# Patient Record
Sex: FEMALE | Race: WHITE
[De-identification: names, ages, dates, MRNs, and addresses within clinical notes are randomized per-mention and may not be internally consistent; named-entity substitution may affect disease eponyms.]

---

## 2019-11-22 ENCOUNTER — HOSPITAL ENCOUNTER (INPATIENT)
Dept: HOSPITAL 26 - MED | Age: 39
LOS: 1 days | Discharge: HOME | DRG: 744 | End: 2019-11-23
Attending: OBSTETRICS & GYNECOLOGY | Admitting: OBSTETRICS & GYNECOLOGY
Payer: COMMERCIAL

## 2019-11-22 VITALS — SYSTOLIC BLOOD PRESSURE: 94 MMHG | DIASTOLIC BLOOD PRESSURE: 67 MMHG

## 2019-11-22 VITALS — HEIGHT: 63 IN | BODY MASS INDEX: 25.16 KG/M2 | WEIGHT: 142 LBS

## 2019-11-22 VITALS — SYSTOLIC BLOOD PRESSURE: 94 MMHG | DIASTOLIC BLOOD PRESSURE: 50 MMHG

## 2019-11-22 VITALS — SYSTOLIC BLOOD PRESSURE: 119 MMHG | DIASTOLIC BLOOD PRESSURE: 79 MMHG

## 2019-11-22 DIAGNOSIS — D25.9: ICD-10-CM

## 2019-11-22 DIAGNOSIS — D62: ICD-10-CM

## 2019-11-22 DIAGNOSIS — D64.9: ICD-10-CM

## 2019-11-22 DIAGNOSIS — F32.9: ICD-10-CM

## 2019-11-22 DIAGNOSIS — N92.0: Primary | ICD-10-CM

## 2019-11-22 LAB
ALBUMIN FLD-MCNC: 3.2 G/DL (ref 3.4–5)
ANION GAP SERPL CALCULATED.3IONS-SCNC: 10.8 MMOL/L (ref 8–16)
APPEARANCE UR: (no result)
AST SERPL-CCNC: 14 U/L (ref 15–37)
BASOPHILS # BLD AUTO: 0 K/UL (ref 0–0.22)
BASOPHILS NFR BLD AUTO: 0.6 % (ref 0–2)
BILIRUB SERPL-MCNC: 0.1 MG/DL (ref 0–1)
BILIRUB UR QL STRIP: NEGATIVE
BUN SERPL-MCNC: 10 MG/DL (ref 7–18)
CHLORIDE SERPL-SCNC: 106 MMOL/L (ref 98–107)
CO2 SERPL-SCNC: 24.9 MMOL/L (ref 21–32)
COLOR UR: (no result)
CREAT SERPL-MCNC: 0.8 MG/DL (ref 0.6–1.3)
EOSINOPHIL # BLD AUTO: 0 K/UL (ref 0–0.4)
EOSINOPHIL NFR BLD AUTO: 0.3 % (ref 0–4)
ERYTHROCYTE [DISTWIDTH] IN BLOOD BY AUTOMATED COUNT: 27.5 % (ref 11.6–13.7)
GFR SERPL CREATININE-BSD FRML MDRD: 103 ML/MIN (ref 90–?)
GLUCOSE SERPL-MCNC: 129 MG/DL (ref 74–106)
GLUCOSE UR STRIP-MCNC: NEGATIVE MG/DL
HCT VFR BLD AUTO: 20.8 % (ref 36–48)
HGB BLD-MCNC: 6.7 G/DL (ref 12–16)
HGB UR QL STRIP: (no result)
LEUKOCYTE ESTERASE UR QL STRIP: (no result)
LYMPHOCYTES # BLD AUTO: 1.4 K/UL (ref 2.5–16.5)
LYMPHOCYTES NFR BLD AUTO: 16 % (ref 20.5–51.1)
MCH RBC QN AUTO: 28 PG (ref 27–31)
MCHC RBC AUTO-ENTMCNC: 32 G/DL (ref 33–37)
MCV RBC AUTO: 86.8 FL (ref 80–94)
MONOCYTES # BLD AUTO: 0.6 K/UL (ref 0.8–1)
MONOCYTES NFR BLD AUTO: 6.6 % (ref 1.7–9.3)
NEUTROPHILS # BLD AUTO: 6.6 K/UL (ref 1.8–7.7)
NEUTROPHILS NFR BLD AUTO: 76.5 % (ref 42.2–75.2)
NITRITE UR QL STRIP: POSITIVE
PH UR STRIP: 5 [PH] (ref 5–9)
PLATELET # BLD AUTO: 405 K/UL (ref 140–450)
POTASSIUM SERPL-SCNC: 3.7 MMOL/L (ref 3.5–5.1)
PROTHROMBIN TIME: 9.3 SECS (ref 10.8–13.4)
RBC # BLD AUTO: 2.39 MIL/UL (ref 4.2–5.4)
RBC #/AREA URNS HPF: >100 /HPF (ref 0–5)
SODIUM SERPL-SCNC: 138 MMOL/L (ref 136–145)
WBC # BLD AUTO: 8.6 K/UL (ref 4.8–10.8)
WBC,URINE: (no result) /HPF (ref 0–5)

## 2019-11-22 PROCEDURE — P9016 RBC LEUKOCYTES REDUCED: HCPCS

## 2019-11-22 PROCEDURE — 30233N1 TRANSFUSION OF NONAUTOLOGOUS RED BLOOD CELLS INTO PERIPHERAL VEIN, PERCUTANEOUS APPROACH: ICD-10-PCS | Performed by: OBSTETRICS & GYNECOLOGY

## 2019-11-22 NOTE — NUR
Patient will be admitted to care of DR. HAJI. Admited to MS.  Will go to room 
120-A. Belongings list completed.  Report to LEWIS PAYTON.

## 2019-11-22 NOTE — NUR
RECEIVED BEDSIDE REPORT FROM DAY SHIFT NURSE, PT IS AAOX4, SPOUSE AT BEDSIDE. DR. COHEN 
VISITED. IV LINE ON THE RT HAND G. 22 WITH BLOOD TRANSFUSION, PT DENIES PAIN AND NO SIGN OF 
DISTRESS NOTED, SIDE RAISE ARE UP AND CALL LIGHT WITHIN REACH, SAFETY PRECAUTION INITIATED, 
BOARD UPDATED, BED IN LOW POSITION.

## 2019-11-22 NOTE — NUR
PROVERA IS NOT AVAILABLE IN THE UNIT. CALLED DR. ROTH AND HE ORDERED OK TO START FROM 
TOMORROW MORNING. REPORTED DR. PT DOES NOT HAVE FLUID ORDER AFTER BLOOD TRANSFUSION. 
RECEIVED LR @125MLS/HR. WILL START IV FLUID AFTER BLOOD UNIT. VS CHECKED, WITHIN PT'S 
BASELINE. WILL CONTINUE TO MONITOR.

## 2019-11-22 NOTE — NUR
VITAL SIGNS TAKEN  AND BP IS 94/35, TEMPERATURE IS 97.7, PULSE IS 74, O2 SATURATION IS AT 
100%, NO SIGN OF DISTRESS NOTED AND WILL MONITOR PT.

## 2019-11-22 NOTE — NUR
RECEIVED PT FROM ER NURSE, VIA SANJAYRYANELY, PT IS AWAKE AND AMBULATED TO THE BED, WITH FAMILY ON 
THE BEDSIDE, IV LINE ON THE RT HAND G. 22 WITH NS INFUSING AT BOLUS AT A 300ML VOLUME, PT 
DENIES PAIN AND NO SIGN OF DISTRESS NOTED, SIDE RAISL ARE UP AND CALL LIGHT WITHIN REACH, 
SAFETY PRECAUTION INITIATED, BELONGINGS ARE WITH PT WILL CONTINUE TO BE MONITORED

## 2019-11-22 NOTE — NUR
PATIENT PRESENTS TO ED WITH C/O VAGINAL BLEEDING WITH CLOTS SINCE 10/23/19 WITH 
FEELING LIGHTHEADED AND DIZZINESS. NO C/O DISCHARGE OR URINARY SYMPTOMS. C/O 
PALPITATIONS AND GENERAL WEAKNESS. PT STATES THAT SHE IS TAKING AN IRON 
SUPPLEMENT 3X DAY FOR ANEMIA. DENIES N/V/D; SKIN IS PINK/WARM/DRY; AAOX4 WITH 
EVEN AND STEADY GAIT; HR EVEN AND REGULAR; PT DENIES ANY FEVER, CP, SOB, OR 
COUGH AT THIS TIME; PATIENT STATES PAIN OF 0/10 AT THIS TIME; VSS; PATIENT 
POSITIONED FOR COMFORT; HOB ELEVATED; BEDRAILS UP X2; BED DOWN. ER MD SAW PT.

## 2019-11-22 NOTE — NUR
2ND BAG OF BLOOD UNIT STARTED, VERIFIED WITH CHARGE NURSE, BALDEV. PT TOLERATED WELL. WILL 
CONTINUE TO MONITOR.

## 2019-11-23 VITALS — DIASTOLIC BLOOD PRESSURE: 44 MMHG | SYSTOLIC BLOOD PRESSURE: 91 MMHG

## 2019-11-23 VITALS — SYSTOLIC BLOOD PRESSURE: 99 MMHG | DIASTOLIC BLOOD PRESSURE: 44 MMHG

## 2019-11-23 LAB
BASOPHILS # BLD AUTO: 0.1 K/UL (ref 0–0.22)
BASOPHILS NFR BLD AUTO: 0.6 % (ref 0–2)
EOSINOPHIL # BLD AUTO: 0.1 K/UL (ref 0–0.4)
EOSINOPHIL NFR BLD AUTO: 1.1 % (ref 0–4)
ERYTHROCYTE [DISTWIDTH] IN BLOOD BY AUTOMATED COUNT: 21.9 % (ref 11.6–13.7)
HCT VFR BLD AUTO: 26.7 % (ref 36–48)
HGB BLD-MCNC: 8.9 G/DL (ref 12–16)
LYMPHOCYTES # BLD AUTO: 1.7 K/UL (ref 2.5–16.5)
LYMPHOCYTES NFR BLD AUTO: 19.8 % (ref 20.5–51.1)
MCH RBC QN AUTO: 29 PG (ref 27–31)
MCHC RBC AUTO-ENTMCNC: 33 G/DL (ref 33–37)
MCV RBC AUTO: 85.9 FL (ref 80–94)
MONOCYTES # BLD AUTO: 0.5 K/UL (ref 0.8–1)
MONOCYTES NFR BLD AUTO: 5.6 % (ref 1.7–9.3)
NEUTROPHILS # BLD AUTO: 6.4 K/UL (ref 1.8–7.7)
NEUTROPHILS NFR BLD AUTO: 72.9 % (ref 42.2–75.2)
PLATELET # BLD AUTO: 317 K/UL (ref 140–450)
RBC # BLD AUTO: 3.11 MIL/UL (ref 4.2–5.4)
WBC # BLD AUTO: 8.8 K/UL (ref 4.8–10.8)

## 2019-11-23 PROCEDURE — 0UDB8ZZ EXTRACTION OF ENDOMETRIUM, VIA NATURAL OR ARTIFICIAL OPENING ENDOSCOPIC: ICD-10-PCS | Performed by: OBSTETRICS & GYNECOLOGY

## 2019-11-23 NOTE — NUR
DC PLANNING

39 YRS OLD FEMALE WAS ADMITTED FROM HOME WITH A DX OF SYMPTOMATIC ANEMIA  PT HAS HX OF 
CHRONIC ANEMIA DUE TO MENORRHAGIA . H/H 6.6/20.8  2 UNITS PRBC ORDERED IVF  AND ROCEPHIN 
ADMINISTERED. ULTRASOUND SHOWS FIBROIDS HYSTEROSCOPY W/ D&C DONE WITH DR ROTH . STABLE 
FOR DC HOME AND F/U WITH DR ROTH IN 1 WEEK

## 2019-11-23 NOTE — NUR
RECEIVED PT FROM NIGHT NURSE. PT AWAKE IN BED AAOX4 ACCOMPANIED BY SIGNIFICANT OTHER. PT 
DENIES PAIN AT THIS TIME. NO DISTRESS NOTED. RESPIRATIONS EVEN AND UNLABORED ON ROOM AIR, 
CLEAR BREATH SOUNDS. IV IN PLACE ASYMPTOMATIC PATENT AND INFUSING AS PER ORDER IN R HAND 
22G. SKIN INTACT. SAFETY MEASURES IN PLACE. CALL LIGHT WITHIN REACH. BED IN LOW POSITION. 
WILL CONTINUE TO MONITOR.

## 2019-11-23 NOTE — NUR
PT DISCHARGED HOME AT THIS TIME. DISCHARGE AND FOLLOWUP INSTRUCTIONS GIVEN, PT VERBALIZES 
UNDERSTANDING. DISCHARGE PAPERWORK SIGNED BY PT. PERSONAL BELONGINGS REVIEWED AND RETURNED 
TO PT. PT REFUSES FLU VACCINE. IV SITES REMOVED WITH MINIMAL BLOOD LOSS AND LUMEN INTACT. 
RESPIRATIONS EVEN AND UNLABORED, CLEAR BREATH SOUNDS. SKIN INTACT. VITAL SIGNS STABLE. PT 
DENIES PAIN. ID BANDS REMOVED. PT ESCORTED OFF UNIT ON FOOT ACCOMPANIED BY FAMILY MEMBERS.